# Patient Record
Sex: FEMALE
[De-identification: names, ages, dates, MRNs, and addresses within clinical notes are randomized per-mention and may not be internally consistent; named-entity substitution may affect disease eponyms.]

---

## 2024-07-19 ENCOUNTER — P2P PATIENT RECORD (OUTPATIENT)
Age: 64
End: 2024-07-19

## 2024-08-01 ENCOUNTER — DASHBOARD ENCOUNTERS (OUTPATIENT)
Age: 64
End: 2024-08-01

## 2024-08-01 ENCOUNTER — LAB OUTSIDE AN ENCOUNTER (OUTPATIENT)
Dept: URBAN - METROPOLITAN AREA CLINIC 115 | Facility: CLINIC | Age: 64
End: 2024-08-01

## 2024-08-01 ENCOUNTER — OFFICE VISIT (OUTPATIENT)
Dept: URBAN - METROPOLITAN AREA CLINIC 115 | Facility: CLINIC | Age: 64
End: 2024-08-01
Payer: COMMERCIAL

## 2024-08-01 VITALS
BODY MASS INDEX: 26.71 KG/M2 | TEMPERATURE: 98 F | HEART RATE: 79 BPM | SYSTOLIC BLOOD PRESSURE: 131 MMHG | WEIGHT: 170.2 LBS | RESPIRATION RATE: 15 BRPM | HEIGHT: 67 IN | DIASTOLIC BLOOD PRESSURE: 83 MMHG

## 2024-08-01 DIAGNOSIS — K59.09 CHRONIC CONSTIPATION: ICD-10-CM

## 2024-08-01 DIAGNOSIS — R12 HEARTBURN: ICD-10-CM

## 2024-08-01 DIAGNOSIS — R60.0 BILATERAL LEG EDEMA: ICD-10-CM

## 2024-08-01 DIAGNOSIS — K76.9 LIVER LESION: ICD-10-CM

## 2024-08-01 PROBLEM — 300331000: Status: ACTIVE | Noted: 2024-08-01

## 2024-08-01 PROCEDURE — 99204 OFFICE O/P NEW MOD 45 MIN: CPT

## 2024-08-01 PROCEDURE — 99244 OFF/OP CNSLTJ NEW/EST MOD 40: CPT

## 2024-08-01 RX ORDER — TETRAHYDROZOLINE HCL 0.05 G/100ML
1 DROP INTO AFFECTED EYE  AS NEEDED SOLUTION/ DROPS OPHTHALMIC
Status: ACTIVE | COMMUNITY

## 2024-08-01 RX ORDER — BUPROPION HYDROCHLORIDE 75 MG/1
2 TABLETS TABLET, FILM COATED ORAL TWICE A DAY
Status: ACTIVE | COMMUNITY

## 2024-08-01 RX ORDER — ROSUVASTATIN CALCIUM 20 MG/1
1 TABLET TABLET, COATED ORAL ONCE A DAY
Status: ACTIVE | COMMUNITY

## 2024-08-01 NOTE — HPI-TODAY'S VISIT:
65 y/o F with PMH of HLD, osteopenia, preDM presents on referral from Amy Reyna NP for liver lesion on CT. A copy of this note will be sent to referring provider.  11/2023 US showed normal liver/GB. CT on 12/2023 showed a 17mm low density lesion (indeterminate) in posterior R hepatic lobe and 23mm cyst in lateral L hepatic lobe, 3 punctate nonobstructing L kidney stones. Labs from 9/2023 showed AST 16, ALT 16, plts 286, tot bili 0.5, alkP 71, TSH 1.27. States she had a liver ultrasound done 15 yrs ago where she was dx'd with a "blood blister" but no records. Reports some bloating that is intermittent and heartburn; takes omeprazole prn which helps. BMs are qod-q3days, complete evacuation. Reports some weight gain ~10 lbs. Denies jaundice, icterus, pruritus, abdominal pain, n/v, diarrhea, constipation, blood in stool, dysphagia, odynophagia, unintentional weight loss, change in appetite, early satiety. Denies NSAID use. Takes tylenol PRN headache. Reports EtOH qod 2 glasses of wine for the past few years, sometimes drinks more with stress. Denies tobacco/marijuana use.  Last colonoscopy: ~3 yrs ago; no polyps per pt; repeat in 10 yrs

## 2024-08-05 LAB
AFP, SERUM, TUMOR MARKER: 30.6
ALBUMIN/GLOBULIN RATIO: 1.5
ALBUMIN: 4
ALKALINE PHOSPHATASE: 78
ALT (SGPT): 11
AST (SGOT): 14
BILIRUBIN, DIRECT: 0.1
BILIRUBIN, INDIRECT: 0.4
BILIRUBIN, TOTAL: 0.5
GLOBULIN: 2.7
PROTEIN, TOTAL: 6.7

## 2024-08-13 ENCOUNTER — WEB ENCOUNTER (OUTPATIENT)
Dept: URBAN - METROPOLITAN AREA CLINIC 115 | Facility: CLINIC | Age: 64
End: 2024-08-13

## 2024-08-15 ENCOUNTER — WEB ENCOUNTER (OUTPATIENT)
Dept: URBAN - METROPOLITAN AREA CLINIC 115 | Facility: CLINIC | Age: 64
End: 2024-08-15

## 2024-09-06 ENCOUNTER — WEB ENCOUNTER (OUTPATIENT)
Dept: URBAN - METROPOLITAN AREA CLINIC 115 | Facility: CLINIC | Age: 64
End: 2024-09-06

## 2024-09-10 ENCOUNTER — OFFICE VISIT (OUTPATIENT)
Dept: URBAN - METROPOLITAN AREA CLINIC 115 | Facility: CLINIC | Age: 64
End: 2024-09-10

## 2024-10-03 ENCOUNTER — OFFICE VISIT (OUTPATIENT)
Dept: URBAN - METROPOLITAN AREA CLINIC 115 | Facility: CLINIC | Age: 64
End: 2024-10-03
Payer: COMMERCIAL

## 2024-10-03 VITALS
TEMPERATURE: 98 F | DIASTOLIC BLOOD PRESSURE: 88 MMHG | WEIGHT: 168 LBS | HEART RATE: 76 BPM | HEIGHT: 67 IN | SYSTOLIC BLOOD PRESSURE: 124 MMHG | BODY MASS INDEX: 26.37 KG/M2

## 2024-10-03 DIAGNOSIS — K59.09 CHRONIC CONSTIPATION: ICD-10-CM

## 2024-10-03 DIAGNOSIS — R12 HEARTBURN: ICD-10-CM

## 2024-10-03 DIAGNOSIS — R10.30 LOWER ABDOMINAL PAIN: ICD-10-CM

## 2024-10-03 DIAGNOSIS — M79.89 LEG SWELLING: ICD-10-CM

## 2024-10-03 DIAGNOSIS — D18.03 LIVER HEMANGIOMA: ICD-10-CM

## 2024-10-03 PROBLEM — 93469006: Status: ACTIVE | Noted: 2024-10-03

## 2024-10-03 PROCEDURE — 99213 OFFICE O/P EST LOW 20 MIN: CPT

## 2024-10-03 RX ORDER — ROSUVASTATIN CALCIUM 20 MG/1
1 TABLET TABLET, COATED ORAL ONCE A DAY
Status: ACTIVE | COMMUNITY

## 2024-10-03 RX ORDER — BUPROPION HYDROCHLORIDE 75 MG/1
2 TABLETS TABLET, FILM COATED ORAL TWICE A DAY
Status: ACTIVE | COMMUNITY

## 2024-10-03 RX ORDER — TETRAHYDROZOLINE HCL 0.05 G/100ML
1 DROP INTO AFFECTED EYE  AS NEEDED SOLUTION/ DROPS OPHTHALMIC
Status: ACTIVE | COMMUNITY

## 2024-10-03 NOTE — HPI-TODAY'S VISIT:
65 y/o F with PMH of HLD, osteopenia, preDM presents on referral from Amy Reyna NP for liver lesion on CT. A copy of this note will be sent to referring provider.  11/2023 US showed normal liver/GB. CT on 12/2023 showed a 17mm low density lesion (indeterminate) in posterior R hepatic lobe and 23mm cyst in lateral L hepatic lobe, 3 punctate nonobstructing L kidney stones. Labs from 9/2023 showed AST 16, ALT 16, plts 286, tot bili 0.5, alkP 71, TSH 1.27. States she had a liver ultrasound done 15 yrs ago where she was dx'd with a "blood blister" but no records. Reports some bloating that is intermittent and heartburn; takes omeprazole prn which helps. BMs are qod-q3days, complete evacuation. Reports some weight gain ~10 lbs.  Denies NSAID use. Takes tylenol PRN headache. Reports EtOH qod 2 glasses of wine for the past few years, sometimes drinks more with stress. Denies tobacco/marijuana use.  Last colonoscopy: ~3 yrs ago; no polyps per pt; repeat duie 2031  MRI confirmed liver hemangioma and mult hepatic cysts. 8/2024 LFTs normal. Has also been put on antireflux diet with her prn omeprazole. Before her vacation, she started to make some dietary changes but did not note any difference. Heartburn is intermittent; definitely triggered with rich meals and eating late at night. High fiber and miralax 1 cap qod for constipation, pt on probiotics and a little bit of coffee; she states she had not restarted miralax since coming back form vacation, BMs are qd for the last 3-4 days, complete evacuation. Bloating is intermittent. Low abdominal aching sometimes.

## 2025-04-03 ENCOUNTER — OFFICE VISIT (OUTPATIENT)
Dept: URBAN - METROPOLITAN AREA CLINIC 115 | Facility: CLINIC | Age: 65
End: 2025-04-03